# Patient Record
Sex: FEMALE | Race: WHITE | Employment: OTHER | URBAN - NONMETROPOLITAN AREA
[De-identification: names, ages, dates, MRNs, and addresses within clinical notes are randomized per-mention and may not be internally consistent; named-entity substitution may affect disease eponyms.]

---

## 2019-01-01 ENCOUNTER — OFFICE VISIT (OUTPATIENT)
Dept: RADIATION ONCOLOGY | Facility: HOSPITAL | Age: 69
End: 2019-01-01
Attending: RADIOLOGY
Payer: COMMERCIAL

## 2019-01-01 ENCOUNTER — RESULTS ONLY (OUTPATIENT)
Dept: RADIATION ONCOLOGY | Facility: HOSPITAL | Age: 69
End: 2019-01-01

## 2019-01-01 ENCOUNTER — OFFICE VISIT (OUTPATIENT)
Dept: RADIATION ONCOLOGY | Facility: HOSPITAL | Age: 69
End: 2019-01-01
Payer: COMMERCIAL

## 2019-01-01 ENCOUNTER — DOCUMENTATION ONLY (OUTPATIENT)
Dept: FAMILY MEDICINE | Facility: OTHER | Age: 69
End: 2019-01-01

## 2019-01-01 ENCOUNTER — APPOINTMENT (OUTPATIENT)
Dept: RADIATION ONCOLOGY | Facility: HOSPITAL | Age: 69
End: 2019-01-01
Payer: COMMERCIAL

## 2019-01-01 ENCOUNTER — ALLIED HEALTH/NURSE VISIT (OUTPATIENT)
Dept: RADIATION ONCOLOGY | Facility: HOSPITAL | Age: 69
End: 2019-01-01
Payer: COMMERCIAL

## 2019-01-01 ENCOUNTER — MEDICAL CORRESPONDENCE (OUTPATIENT)
Dept: HEALTH INFORMATION MANAGEMENT | Facility: CLINIC | Age: 69
End: 2019-01-01

## 2019-01-01 VITALS
HEART RATE: 84 BPM | BODY MASS INDEX: 45.63 KG/M2 | WEIGHT: 236.7 LBS | HEART RATE: 80 BPM | RESPIRATION RATE: 16 BRPM | SYSTOLIC BLOOD PRESSURE: 122 MMHG | HEART RATE: 80 BPM | BODY MASS INDEX: 44.97 KG/M2 | WEIGHT: 238 LBS | BODY MASS INDEX: 44.72 KG/M2 | SYSTOLIC BLOOD PRESSURE: 140 MMHG | DIASTOLIC BLOOD PRESSURE: 82 MMHG | DIASTOLIC BLOOD PRESSURE: 78 MMHG | RESPIRATION RATE: 16 BRPM | WEIGHT: 241.5 LBS | DIASTOLIC BLOOD PRESSURE: 64 MMHG | RESPIRATION RATE: 16 BRPM | SYSTOLIC BLOOD PRESSURE: 122 MMHG

## 2019-01-01 VITALS
SYSTOLIC BLOOD PRESSURE: 122 MMHG | WEIGHT: 240 LBS | DIASTOLIC BLOOD PRESSURE: 66 MMHG | BODY MASS INDEX: 45.35 KG/M2 | HEART RATE: 80 BPM | RESPIRATION RATE: 16 BRPM

## 2019-01-01 VITALS
BODY MASS INDEX: 44.4 KG/M2 | RESPIRATION RATE: 16 BRPM | SYSTOLIC BLOOD PRESSURE: 132 MMHG | DIASTOLIC BLOOD PRESSURE: 70 MMHG | WEIGHT: 235 LBS | HEART RATE: 76 BPM

## 2019-01-01 VITALS
RESPIRATION RATE: 16 BRPM | DIASTOLIC BLOOD PRESSURE: 62 MMHG | HEIGHT: 61 IN | HEART RATE: 92 BPM | BODY MASS INDEX: 47.01 KG/M2 | SYSTOLIC BLOOD PRESSURE: 116 MMHG | WEIGHT: 249 LBS

## 2019-01-01 DIAGNOSIS — C25.0 MALIGNANT NEOPLASM OF HEAD OF PANCREAS (H): Primary | ICD-10-CM

## 2019-01-01 LAB
RAD ONC ARIA COURSE ID: NORMAL
RAD ONC ARIA COURSE LAST TREATMENT DATE: NORMAL
RAD ONC ARIA COURSE START DATE: NORMAL
RAD ONC ARIA COURSE TREATMENT ELAPSED DAYS: 0
RAD ONC ARIA COURSE TREATMENT ELAPSED DAYS: 1
RAD ONC ARIA COURSE TREATMENT ELAPSED DAYS: 10
RAD ONC ARIA COURSE TREATMENT ELAPSED DAYS: 13
RAD ONC ARIA COURSE TREATMENT ELAPSED DAYS: 14
RAD ONC ARIA COURSE TREATMENT ELAPSED DAYS: 15
RAD ONC ARIA COURSE TREATMENT ELAPSED DAYS: 16
RAD ONC ARIA COURSE TREATMENT ELAPSED DAYS: 17
RAD ONC ARIA COURSE TREATMENT ELAPSED DAYS: 2
RAD ONC ARIA COURSE TREATMENT ELAPSED DAYS: 20
RAD ONC ARIA COURSE TREATMENT ELAPSED DAYS: 21
RAD ONC ARIA COURSE TREATMENT ELAPSED DAYS: 22
RAD ONC ARIA COURSE TREATMENT ELAPSED DAYS: 23
RAD ONC ARIA COURSE TREATMENT ELAPSED DAYS: 24
RAD ONC ARIA COURSE TREATMENT ELAPSED DAYS: 27
RAD ONC ARIA COURSE TREATMENT ELAPSED DAYS: 28
RAD ONC ARIA COURSE TREATMENT ELAPSED DAYS: 29
RAD ONC ARIA COURSE TREATMENT ELAPSED DAYS: 3
RAD ONC ARIA COURSE TREATMENT ELAPSED DAYS: 31
RAD ONC ARIA COURSE TREATMENT ELAPSED DAYS: 34
RAD ONC ARIA COURSE TREATMENT ELAPSED DAYS: 35
RAD ONC ARIA COURSE TREATMENT ELAPSED DAYS: 36
RAD ONC ARIA COURSE TREATMENT ELAPSED DAYS: 37
RAD ONC ARIA COURSE TREATMENT ELAPSED DAYS: 38
RAD ONC ARIA COURSE TREATMENT ELAPSED DAYS: 6
RAD ONC ARIA COURSE TREATMENT ELAPSED DAYS: 7
RAD ONC ARIA COURSE TREATMENT ELAPSED DAYS: 8
RAD ONC ARIA COURSE TREATMENT ELAPSED DAYS: 9
RAD ONC ARIA FIRST TREATMENT DATE: NORMAL
RAD ONC ARIA PLAN FRACTIONS TREATED TO DATE: 1
RAD ONC ARIA PLAN FRACTIONS TREATED TO DATE: 10
RAD ONC ARIA PLAN FRACTIONS TREATED TO DATE: 11
RAD ONC ARIA PLAN FRACTIONS TREATED TO DATE: 12
RAD ONC ARIA PLAN FRACTIONS TREATED TO DATE: 13
RAD ONC ARIA PLAN FRACTIONS TREATED TO DATE: 14
RAD ONC ARIA PLAN FRACTIONS TREATED TO DATE: 15
RAD ONC ARIA PLAN FRACTIONS TREATED TO DATE: 16
RAD ONC ARIA PLAN FRACTIONS TREATED TO DATE: 17
RAD ONC ARIA PLAN FRACTIONS TREATED TO DATE: 18
RAD ONC ARIA PLAN FRACTIONS TREATED TO DATE: 19
RAD ONC ARIA PLAN FRACTIONS TREATED TO DATE: 2
RAD ONC ARIA PLAN FRACTIONS TREATED TO DATE: 20
RAD ONC ARIA PLAN FRACTIONS TREATED TO DATE: 21
RAD ONC ARIA PLAN FRACTIONS TREATED TO DATE: 22
RAD ONC ARIA PLAN FRACTIONS TREATED TO DATE: 23
RAD ONC ARIA PLAN FRACTIONS TREATED TO DATE: 24
RAD ONC ARIA PLAN FRACTIONS TREATED TO DATE: 25
RAD ONC ARIA PLAN FRACTIONS TREATED TO DATE: 26
RAD ONC ARIA PLAN FRACTIONS TREATED TO DATE: 27
RAD ONC ARIA PLAN FRACTIONS TREATED TO DATE: 28
RAD ONC ARIA PLAN FRACTIONS TREATED TO DATE: 3
RAD ONC ARIA PLAN FRACTIONS TREATED TO DATE: 4
RAD ONC ARIA PLAN FRACTIONS TREATED TO DATE: 5
RAD ONC ARIA PLAN FRACTIONS TREATED TO DATE: 6
RAD ONC ARIA PLAN FRACTIONS TREATED TO DATE: 7
RAD ONC ARIA PLAN FRACTIONS TREATED TO DATE: 8
RAD ONC ARIA PLAN FRACTIONS TREATED TO DATE: 9
RAD ONC ARIA PLAN ID: NORMAL
RAD ONC ARIA PLAN NAME: NORMAL
RAD ONC ARIA PLAN PRESCRIBED DOSE PER FRACTION: 1.8 GY
RAD ONC ARIA PLAN TOTAL FRACTIONS PRESCRIBED: 28
RAD ONC ARIA PLAN TOTAL PRESCRIBED DOSE: 5040 CGY
RAD ONC ARIA REFERENCE POINT DOSAGE GIVEN TO DATE: NORMAL GY
RAD ONC ARIA REFERENCE POINT ID: NORMAL

## 2019-01-01 PROCEDURE — 77014 ZZHC CT GUIDE FOR PLACEMENT RADIATION THERAPY FIELDS: CPT | Mod: 26 | Performed by: RADIOLOGY

## 2019-01-01 PROCEDURE — 77386 ZZH IMRT TREATMENT DELIVERY, COMPLEX: CPT | Performed by: RADIOLOGY

## 2019-01-01 PROCEDURE — 77427 RADIATION TX MANAGEMENT X5: CPT | Performed by: RADIOLOGY

## 2019-01-01 PROCEDURE — G0463 HOSPITAL OUTPT CLINIC VISIT: HCPCS | Performed by: RADIOLOGY

## 2019-01-01 PROCEDURE — 77338 DESIGN MLC DEVICE FOR IMRT: CPT | Performed by: RADIOLOGY

## 2019-01-01 PROCEDURE — 77301 RADIOTHERAPY DOSE PLAN IMRT: CPT | Performed by: RADIOLOGY

## 2019-01-01 PROCEDURE — 77336 RADIATION PHYSICS CONSULT: CPT | Performed by: RADIOLOGY

## 2019-01-01 PROCEDURE — 77300 RADIATION THERAPY DOSE PLAN: CPT | Performed by: RADIOLOGY

## 2019-01-01 PROCEDURE — 77334 RADIATION TREATMENT AID(S): CPT | Performed by: RADIOLOGY

## 2019-01-01 RX ORDER — METOPROLOL SUCCINATE 100 MG/1
TABLET, EXTENDED RELEASE ORAL
COMMUNITY
Start: 2019-01-01

## 2019-01-01 RX ORDER — OXYCODONE HYDROCHLORIDE 5 MG/1
5-10 TABLET ORAL
COMMUNITY
Start: 2019-01-01 | End: 2019-01-01

## 2019-01-01 RX ORDER — ONDANSETRON 4 MG/1
TABLET, ORALLY DISINTEGRATING ORAL
Refills: 0 | COMMUNITY
Start: 2019-01-01

## 2019-01-01 RX ORDER — PROCHLORPERAZINE MALEATE 10 MG
TABLET ORAL
Refills: 2 | COMMUNITY
Start: 2019-01-01

## 2019-01-01 RX ORDER — FUROSEMIDE 20 MG
10 TABLET ORAL
COMMUNITY
Start: 2019-01-01

## 2019-01-01 RX ORDER — TIOTROPIUM BROMIDE 18 UG/1
1 CAPSULE ORAL; RESPIRATORY (INHALATION)
COMMUNITY
Start: 2019-01-01

## 2019-01-01 RX ORDER — PAROXETINE 20 MG/1
TABLET, FILM COATED ORAL
COMMUNITY
Start: 2018-06-29

## 2019-01-01 RX ORDER — AMITRIPTYLINE HYDROCHLORIDE 50 MG/1
TABLET ORAL
COMMUNITY
Start: 2018-07-13

## 2019-01-01 RX ORDER — OXYCODONE HYDROCHLORIDE 5 MG/1
5-10 TABLET ORAL
COMMUNITY
Start: 2019-01-01

## 2019-01-01 RX ORDER — METOPROLOL SUCCINATE 25 MG/1
TABLET, EXTENDED RELEASE ORAL
COMMUNITY
Start: 2019-01-01

## 2019-01-01 RX ORDER — ALBUTEROL SULFATE 90 UG/1
AEROSOL, METERED RESPIRATORY (INHALATION)
Refills: 0 | COMMUNITY
Start: 2019-01-01

## 2019-01-01 RX ORDER — CAPECITABINE 500 MG/1
1500 TABLET, FILM COATED ORAL
COMMUNITY
Start: 2019-01-01

## 2019-01-01 RX ORDER — LORAZEPAM 0.5 MG/1
TABLET ORAL
COMMUNITY
Start: 2019-01-01

## 2019-01-01 RX ORDER — WARFARIN SODIUM 1 MG/1
TABLET ORAL
COMMUNITY
Start: 2019-01-01

## 2019-01-01 RX ORDER — POTASSIUM CHLORIDE 1500 MG/1
20 TABLET, EXTENDED RELEASE ORAL
COMMUNITY
Start: 2018-02-22

## 2019-01-01 ASSESSMENT — PAIN SCALES - GENERAL
PAINLEVEL: SEVERE PAIN (6)
PAINLEVEL: NO PAIN (0)
PAINLEVEL: NO PAIN (0)
PAINLEVEL: MODERATE PAIN (4)
PAINLEVEL: MODERATE PAIN (5)

## 2019-01-01 ASSESSMENT — PATIENT HEALTH QUESTIONNAIRE - PHQ9: SUM OF ALL RESPONSES TO PHQ QUESTIONS 1-9: 4

## 2019-01-01 ASSESSMENT — MIFFLIN-ST. JEOR: SCORE: 1601.84

## 2019-05-20 NOTE — PROGRESS NOTES
"INITIAL PATIENT ASSESSMENT    .  Chief Complaint   Patient presents with     Radiation Therapy       Initial /62 (BP Location: Left arm, Patient Position: Chair, Cuff Size: Adult Large)   Pulse 92   Resp 16   Ht 1.549 m (5' 1\")   Wt 112.9 kg (249 lb)   BMI 47.05 kg/m   Estimated body mass index is 47.05 kg/m  as calculated from the following:    Height as of this encounter: 1.549 m (5' 1\").    Weight as of this encounter: 112.9 kg (249 lb).  Medication Reconciliation: complete    Referring Physician: Friday  Other Physicians: Hayley Benson    Diagnosis: pancreatic cancer    Prior radiation therapy: None    Prior chemotherapy:   Protocol: Gemcitabine/abraxane/5FU/Oxaliplatin  Facility:   Dates:       Protocol: Xeloda  Facility:   Dates: Starting with Radiation      Prior hormonal therapy:N/A    Pain Eval:  Current history of pain associated with this visit:   Intensity: 5/10  Current: dull  Location: abdomen   Treatment: oxycodone    Psychosocial  Marital Status:    Spouse/Significant other: n/a   Children: 2 living; 1 ; 1 foster child   Occupation: mental health caretaker    Retired: Yes  Living arrangements: home alone  Do you feel safe at home? Yes  Activity status: ambulates with assistive device occasionally, otherwise independent   referral needs: Not needed    Advanced Directive: No    Breast Exam: 2018  Mammogram: 2018  Last Pap: many years  : 3  Para: 3  Onset of menarche: 14  LMP: No LMP recorded.  Onset of menopause: 40  Abnormal vaginal bleeding/discharge: No  Are you pregnant? No  Reproductive note: n/a    Patient was assessed for the influenza, pneumo-poly, prevnar 13, Tdap, and shingles immunizations. \"Vaccinations and Cancer Treatment\" flyer given.  Instructed patient to discuss vaccination status with his/her PCM.     Patient was assessed using the NCCN psychosocial distress thermometer. Patient rated the score as a 0. Patient rated current " stressors as n/a. Stressors will be brought to the attention of provider or Oncology RN Care Coordinator for a score of 6 or greater or per nurses discretion.   Pt is here today for a consult for radiation therapy for pancreatic cancer.  Educated patient on the mapping process and the possible side effects of XRT to the abdomen, to include: fatigue, skin reaction, nausea/vomiting, and diarrhea.  Pt verbalizes an understanding and has no questions at this time.   Erica Ledezma RN

## 2019-05-20 NOTE — PROGRESS NOTES
"[facility]    Radiation Oncology Consultation     Date of Consult: 05/20/19    Referring Physician  Abran Almanza MD    Primary Care Physician   Jayna Benson NP    Reason for Consult   I was asked by Dr Abran Almanza to see Marci Patrick in consultation for her pancreatic cancer.    History is obtained from the patient and her medical records.    History of the Present Illness  Marci Patrick is a 69 year old female who has a history of cervical carcinoma treated with THELMA/BSO and was diagnosed with pancreatic adenocarcinoma on endoscopic ultrasound-guided fine needle aspiration 1/18/2019 after she presented with abdominal pain and pancreatitis.  She required an external biliary drain which was later internalized with a stent by Dr. Patrick Hayward.  The tumor was \"borderline resectable\" due to periportal adenopathy and the tumor situated adjacent to the superior mesenteric artery.    CT of the abdomen on 12/25/2018 showed extensive inflammatory change centered at the pancreatic head and descending duodenum as well as hepatic steatosis.    CT of the abdomen on 1/9/2019 showed a pigtail drain catheter terminating at the level of the pancreatic head and duodenum with significantly decreased regional inflammation as compared to 12/25/2018.  There was heterogenous enhancement of the head of the pancreas, presumably neoplastic in nature; dilated pancreatic duct and atrophy of the body and tail.  Again noted was diffuse fatty liver infiltration.    MRI of the abdomen on 1/17/2019 showed an ill-defined 3.0 cm periportal enhancing area within the left hepatic lobe, suggestive of transient hepatic intensity difference or less likely a malignant process.  A small 2.0 cm hepatic cavernous hemangioma at the hepatic dome.  Ill-defined pancreatic mass consistent with known malignancy.  Multiple enlarged gastrohepatic lymph nodes suspicious for pati metastasis.    CT of the abdomen on 1/30/2019 showed circumferential wall " thickening with moderate mesenteric inflammatory changes surrounding the proximal duodenum suspicious for an inflammatory/infectious process.  Common bile duct stent in place with pneumobilia.  Heterogenous pancreatic head mass consistent with known neoplasm, persistent pancreatic ductal dilatation.  Persistent enlarged gastrohepatic lymph nodes.  Persistent multiple subcutaneous inflammatory changes of the abdominal pannus, nonspecific.    CT of the abdomen on 2/3/2019 showed an inflammatory process predominating around the duodenum/pancreatic head with some stranding of fat into the more inferior retroperitoneum slightly progressive.  No appreciable abscess.    CT scan of the chest abdomen and pelvis on 2/7/2019 showed worsened lung aeration with possible interstitial type pneumonitis versus volume overload.  Fatty liver without obstruction.  Biliary air with biliary ductal stent in place and low-density pancreatic head process as before.  No new fluid collections.    CT scan of the head on 3/31/2019 was unremarkable with no acute intracranial hemorrhage.    CT of the cervical spine on 3/31/2019 showed no cervical spine fracture or subluxation.    CT angiogram of the chest on 4/1/2019 showed no CTA evidence of acute pulmonary embolism.  Scattered groundglass airspace opacities within the bilateral lower lobes, decreased since the prior study, may suggest an inflammatory/infectious process.  Left coronary artery atherosclerotic vascular calcifications.  Extravasation of injected IV contrast into the subcutaneous soft tissues of the right breast surrounding the chest port.      CT of the chest abdomen and pelvis on 5/7/2019 showed stable ill-defined pancreatic head mass (approximately 1.8 x 1.2 cm) with dilated pancreatic duct.  Stable common bile duct stent with associated pneumobilia.  Mild groundglass airspace opacities within the bilateral lower lobes, slightly decreased since the prior study.    The patient has  received combination chemotherapy with gemcitabine, Abraxane, 5-FU & oxaliplatinum.  The patient states she was an inpatient for her first cycle of chemotherapy, which she did not tolerate well.  She experienced hallucinations and was unable to walk.  She has tolerated the subsequent 3 cycles of chemotherapy very well.  Her current ECOG performance status is 1-2.  Her last cycle of chemotherapy was administered 5/8/2019.    PAST MEDICAL HISTORY:   Past Medical History:   Diagnosis Date     Atrial flutter (H)      Cervical cancer (H)      COPD (chronic obstructive pulmonary disease) (H)      Depression      DMII (diabetes mellitus, type 2) (H)      HTN (hypertension)      Hx of deep venous thrombosis        PAST SURGICAL HISTORY:   Past Surgical History:   Procedure Laterality Date     APPENDECTOMY       AS REVISE TOTAL HIP REPLACEMENT Left      CARPAL TUNNEL RELEASE RT/LT Right      CATARACT IOL, RT/LT Bilateral      CHOLECYSTECTOMY       HERNIA REPAIR       HYSTERECTOMY RADICAL, SALPINGO-OOPHORECTOMY Bilateral      LAPAROSCOPIC GASTRIC SLEEVE         FAMILY HISTORY:   Family History   Problem Relation Age of Onset     Lung Cancer Mother      Breast Cancer Sister      Cervical Cancer Sister      Lung Cancer Sister         Twin sister       SOCIAL HISTORY:   Social History     Tobacco Use     Smoking status: Current Every Day Smoker     Packs/day: 1.00     Years: 35.00     Pack years: 35.00     Types: Cigarettes     Smokeless tobacco: Never Used     Tobacco comment: down to 0.25 ppd   Substance Use Topics     Alcohol use: Not Currently       Code Status    Full code.    CURRENT MEDICATIONS:   Current Outpatient Medications   Medication     amitriptyline (ELAVIL) 50 MG tablet     amylase-lipase-protease (CREON) 12391-33215 units CPEP per EC capsule     blood glucose (ACCU-CHEK GUIDE) test strip     capecitabine (XELODA) 500 MG tablet CHEMO     furosemide (LASIX) 20 MG tablet     insulin aspart (NOVOLOG FLEXPEN) 100  "UNIT/ML pen     insulin isophane human (HUMULIN N KWIKPEN) 100 UNIT/ML injection     LORazepam (ATIVAN) 0.5 MG tablet     magnesium oxide (MAG-OX) 400 (241.3 Mg) MG tablet     metoprolol succinate ER (TOPROL-XL) 100 MG 24 hr tablet     metoprolol succinate ER (TOPROL-XL) 25 MG 24 hr tablet     oxyCODONE (ROXICODONE) 5 MG tablet     PARoxetine (PAXIL) 20 MG tablet     potassium chloride ER (K-DUR/KLOR-CON M) 20 MEQ CR tablet     tiotropium (SPIRIVA HANDIHALER) 18 MCG inhaled capsule     warfarin (COUMADIN) 1 MG tablet     albuterol (PROAIR RESPICLICK) 108 (90 Base) MCG/ACT inhaler     ondansetron (ZOFRAN-ODT) 4 MG ODT tab     prochlorperazine (COMPAZINE) 10 MG tablet     No current facility-administered medications for this visit.        Allergies   Allergies   Allergen Reactions     Bupropion Hives     Zyban       Review of Systems   Some fatigue.  Mild persistent abdominal pain.  Ankle swelling which has resolved.  A complete 12 point review of systems is otherwise negative.    Physical Exam     VITAL SIGNS:  /62 (BP Location: Left arm, Patient Position: Chair, Cuff Size: Adult Large)   Pulse 92   Resp 16   Ht 1.549 m (5' 1\")   Wt 112.9 kg (249 lb)   BMI 47.05 kg/m    Wt Readings from Last 4 Encounters:   05/20/19 112.9 kg (249 lb)   General: Awake, alert, cooperative, no apparent distress, and appears stated age.  Head: Normocephalic, without obvious abnormality, atraumatic.  Eyes: Lids normal, pupils equal, round and reactive to light, extra ocular muscles intact, sclera clear with no icterus, conjunctiva normal.  ENT: External ears and nose without lesions, oral pharynx with moist mucus membranes, no mucosal erythema or exudates, gums normal and good dentition.  Neck: supple, trachea midline, no thyromegaly.   Lymphatics: There are no lymph nodes palpable in the cervical or supraclavicular areas.  Respiratory: Normal respiratory effort, good air exchange, clear to auscultation bilaterally, no crackles " or wheezing.  Cardiovascular: Normal heart sounds with regular rate and rhythm and no murmur. Peripheral pulses intact.  Abdomen: Normal bowel sounds, soft, obese, non-tender, no masses palpated, no hepatosplenomegaly.  Musculoskeletal: There is no redness, warmth, or swelling of the joints.  Full range of motion noted.  Tone is normal.  Neurologic: Awake, alert, oriented to name, place and time.  Cranial nerves II-XII are grossly intact.  Muscle strength is 5 out of 5 bilaterally. Gait is normal.   Psychiatric: Calm, normal eye contact, alert, normal affect, memory for past and recent events intact and thought process normal.    PATHOLOGY FINDINGS: See HPI.    IMAGING:  See HPI.    IMPRESSION: Pancreatic adenocarcinoma, essentially unresectable due to proximity to the superior mesenteric artery and presence of periportal adenopathy; s/p combination chemotherapy x4 cycles.    RECOMMENDATIONS: Recommend proceeding with combined radiation therapy and chemotherapy.  Potential benefits and risks were discussed in detail with the patient.  All of her questions were answered to her satisfaction.  She wishes to proceed with treatment as recommended.  Informed consent was obtained.    PLAN: The patient will return for a treatment planning CT scan of the abdomen.  After an acceptable treatment plan has been developed, she will return to begin combined external beam radiation therapy with oral capecitabine chemotherapy.    Thank you for asking us to participate in the care of Marci Patrick.      Lucy Barboza MD

## 2019-05-24 NOTE — PROGRESS NOTES
RADIATION THERAPY SIMULATION NOTE     DIAGNOSIS  Pancreatic adenocarcinoma, essentially unresectable due to proximity to the superior mesenteric artery and presence of periportal adenopathy; s/p combination chemotherapy x4 cycles.      PROCEDURE  Marci Patrick was brought into the simulation suite and placed in a modified supine position.  Careful alignment was accomplished using orthogonal lasers.      Positioning was aided with the use of a vacuum bag.      Bolus was not utilized.      Isocenter was placed & images were reviewed.     All imaging will be used for treatment volume delineation, isodose planning and treatment field design for treatment of the patient's pancreatic adenocarcinoma. Anticipate IMRT treatment technique will be used.    Thank you for asking us to participate in the care of Marci Patrick.    Lucy Barboza MD

## 2019-06-11 NOTE — PROGRESS NOTES
DIAGNOSIS:  Pancreatic head adenocarcinoma, essentially unresectable due to proximity to the superior mesenteric artery and presence of periportal adenopathy. She is status post-4 cycles of infusional combination chemotherapy.    RADIOTHERAPY:  Today she received number 6 out of a scheduled total of 28 treatments to her pancreas tumor by IMRT technique.  The cumulative dose to date is 1080 cGy. The daily fraction size is 180 cGy.    She receives adjuvant chemotherapy with oral Xeloda.    SUBJECTIVE:  She is tolerating the chemoradiotherapy quite well. She does often have nausea, without vomiting, after meals. She gets relief by taking oral Compazine. Xeloda has not caused any hand or foot problems or stomatitis. She says there has been some recent weight loss, from 249# to 241#.     She continues to experience the abdominal pains that radiate from the front to her back. For these she gets relief from 10 milligrams of Oxycodone about twice a day. However, she is quite certain that the frequency of these pains has diminished since the radiotherapy and Xeloda were started.  She recalls that there have been 2 recent days when Oxycodone has not been needed.    She has started using a cane because of feeling short of breath.    PLAN:  Continue with the combined treatments as scheduled.     Kenroy Austin MD

## 2019-06-21 NOTE — PROGRESS NOTES
[facility]  SPECIALIZING IN BREAKTHROUGHS  Radiation Oncology    On Treatment Visit Note      Marci Patrick      Date: 2019   MRN: [id number]   : 1950  Diagnosis: Pancreatic Cancer    Treatment Summary to Date   upper abdomen/Pancreas  2520 cGy   14 of 28 planned fx     Chemotherapy  Chemo concurrent with radx?: Yes  Oncologist: Friday  Drug Name/Frequency 1: Xeloda    Subjective: Doing well with some nausea. Zofran helps  No diaahea     Nursing ROS:   Nutrition Alteration  Diet Type: Patient's Preference  Anorexia NCI: 1 - Loss of appetite  Skin  Skin Reaction: 0 - No changes     ENT and Mouth Exam  Mucositis - Current: 0 - None   Mucositis - Internal Severity: 0 - None      Gastrointestinal  Nausea: 1 - One to two episodes of nausea/24  Vomitin - No vomiting (ons)  Diarrhea: 0 - None     Psychosocial  Mood - Anxiety: 0 - Normal  Mood - Depression: 0 - Normal  Pyschosocial Note: fatigue: 6/10       Objective:   /66   Pulse 80   Resp 16   Wt 108.9 kg (240 lb)   BMI 45.35 kg/m    Gen: Appears well, NAD       Assessment:    Tolerating radiation therapy well.  All questions and concerns addressed.    Plan:   1. Continue current therapy.        Mosaiq chart and setup information reviewed  MVCT checked    Gautam Haqeu MD  Radiation Oncology

## 2019-06-27 NOTE — PROGRESS NOTES
DIAGNOSIS:  Adenocarcinoma of the pancreatic head, essentially unresectable due to proximity to the superior mesenteric artery and presence of periportal adenopathy. She is status post-4 cycles of infusional combination chemotherapy.     RADIOTHERAPY:  She has received 3420 cGy in 18 of 28 fractions to her pancreas tumor by IMRT technique.      She receives adjuvant chemotherapy with oral Xeloda.     SUBJECTIVE:  She is tolerating the chemoradiotherapy very well. Her appetite is not good. She does have nausea, but no vomiting. Compazine helps. She denies any hand/foot problems & stomatitis. She sleeps well. No diarrhea.     She continues to have intermittent abdominal pains that radiate from her front to her back. Oxycodone 10 mg BID is helpful. She says that there are days when Oxycodone has not been needed.     OBJECTIVE:  Weight today is 237.5 lbs, down from 241.5 at the start of treatment. Skin in the irradiated area is intact     ASSESSMENT: Stable.    PLAN:  Continue with the combined modality treatment as scheduled.   Patient was counseled about weight loss & given samples of liquid nutritional supplements       Lucy Barboza MD

## 2019-07-03 NOTE — PROGRESS NOTES
Progress Notes  Encounter Date: Jul 3, 2019  Jason Ramos MD     RADIATION ONCOLOGY PROGRESS NOTE     Patient Care Team       Relationship Specialty Notifications Start End    Jayna Benson, NP PCP - General   5/23/19     Phone: [phone] Fax: [phone]         [facility] [address] [facility] [address]          DIAGNOSIS: (C25.0) Malignant neoplasm of head of pancreas (H)  (primary encounter diagnosis)     RADIATION THERAPY:    Marci Patrick has received 3960 cGy to date for pancreatic cancer, unresectable.       SUBJECTIVE:    She reports mild to moderate nausea. No vomiting. She has mild fatigue. She has minimal abdominal pain at times.        OBJECTIVE:    /82 (BP Location: Left arm, Patient Position: Chair, Cuff Size: Adult Large)   Pulse 84   Resp 16   Wt 107.4 kg (236 lb 11.2 oz)   BMI 44.72 kg/m    Examination reveals Weight down a pound in the past week. Otherwise stable.       IMPRESSION:  Routine tolerance to radiation therapy.      PLAN:  Continue treatment as planned.           Jason Ramos MD

## 2019-07-10 NOTE — PROGRESS NOTES
Progress Notes  Encounter Date: Jul 10, 2019  Sancho Tilley MD     RADIATION ONCOLOGY PROGRESS NOTE     Patient Care Team       Relationship Specialty Notifications Start End    Jayna Benson NP PCP - General   5/23/19     Phone: [phone] Fax: [phone]         [facility] [address] [facility] [address]                  DIAGNOSIS: (C25.0) Malignant neoplasm of head of pancreas (H)  (primary encounter diagnosis)           RADIATION THERAPY:    Marci Patrick has received 4680 cGy to date for her pancreas cancer. Pt taking concurrent Capecitabine      SUBJECTIVE:    She is doing well currently and is asymptomatic with the exception of poor appetite. She reports no nausea or vomiting. Her fatigue is very mild       OBJECTIVE:    /70 (BP Location: Left arm, Patient Position: Chair, Cuff Size: Adult Regular)   Pulse 76   Resp 16   Wt 106.6 kg (235 lb)   BMI 44.40 kg/m    Examination reveals Abdomen is normal and soft. Skin shows a mild treatment related dermatitis.        Ports reviewed and approved.      IMPRESSION:  Routine tolerance to radiation therapy. Patient reporting no unexpected side effects.  We discussed MM and recommended she discuss that with her PCP.      PLAN:  Continue treatment as planned           Sancho Tilley MD

## 2019-07-12 NOTE — PROGRESS NOTES
Progress Notes  Encounter Date: Jul 12, 2019  Sancho Tilley MD     RADIATION ONCOLOGY OTV NOTE AND RADIATION THERAPY TREATMENT SUMMARY    Patient Care Team       Relationship Specialty Notifications Start End    Jayna Benson NP PCP - General   5/23/19     Phone: [phone] Fax: [phone]         [facility] [address] [facility] [address]                  DIAGNOSIS: (C25.0) Malignant neoplasm of head of pancreas (H)  (primary encounter diagnosis)     RADIATION THERAPY:    Marci Patrick has received 5040 cGy to the partial abdomen to complete her external beam radiotherapy for her pancreas cancer. Pt took concurrent Capecitabine under the care of MO.    BRIEF HISTORY:  Patient is a 69-year-old female with the past history of cervical carcinoma treated with THELMA/BSO.  Patient was recently diagnosed with pancreatic adenocarcinoma on endoscopic ultrasound-guided fine-needle aspiration January 18, 2019, after presenting with abdominal pain and pancreatitis.  Gastroenterologist Dr. Patrick Hayward placed an external biliary drain which was later internalized with a stent.  Tumor was borderline resectable due to the periportal adenopathy and the tumor was situated adjacent to the superior mesenteric artery.  The patient had received combination chemotherapy with gemcitabine, Abraxane 5-FU and oxalic platinum prior to starting her radiotherapy.  She had adverse reactions to chemotherapy initially she then tolerated 3 cycles of chemotherapy well.  Her ECOG performance status remained as a 1 or 2.  Her last cycle of chemotherapy was administered May 8, 2019.  Patient started external beam radiation therapy with concurrent oral Capecitabine on June 5, 2019.     Patient underwent CT simulation and her therapy was planned and delivered using VMAT IMRT (Rapid Arc), with 6 MV photons.  Marci received a total dose of 5040 cGy delivered in 28 treatment fractions at 180 cGy per fraction.  Total elapsed  calendar days of treatment was 38 days.  Her completion date is today July 12, 2019.    SUBJECTIVE:    Marci  is doing reasonably well but does report a poor appetite. She reports no nausea or vomiting. Her fatigue is very mild.  Continues to have low grade abdominal pain controlled with 3-4 oxycodone tablets per day.  She is suffering no problems with constipation or nausea from her prescription analgesic.      OBJECTIVE:    There were no vitals taken for this visit.  Examination reveals Abdomen is  Soft and nontender  Skin shows a mild treatment related dermatitis.  No signs of jaundice or scleral icterus.   Lungs are clear and heart has a regular rhythm and rate without extra sounds.    Ports reviewed and approved.      IMPRESSION:  Routine tolerance to radiation therapy. Patient reporting no unexpected side effects.  We discussed MM and recommended she discuss that with her PCP. We also discussed treating her abdominal pain  With OTC antacids for probable treatment related gastritis/enteritis. She may develop post prandial diarrhea in which case she may benefit from prescription strength H2 blockers and choleystyramine.       PLAN:   Continue to follow with Dr Osullivan Friday ([place]).  Marci was scheduled for a 3-month follow-up visit here in the Radiation Oncology Department.  She  understands she can contact us at anytime with questions or concerns.  Thank you for asking us to participate the care of Ms. Marci Sheetscihlango Tilley MD  [facility] Radiation Oncology

## 2019-10-25 PROBLEM — Z51.5 PALLIATIVE CARE PATIENT: Status: ACTIVE | Noted: 2019-01-01

## 2019-11-27 PROBLEM — J44.9 CHRONIC OBSTRUCTIVE PULMONARY DISEASE (H): Status: ACTIVE | Noted: 2019-01-01

## 2019-11-27 PROBLEM — S09.90XA CLOSED HEAD INJURY: Status: ACTIVE | Noted: 2019-01-01

## 2019-11-27 PROBLEM — E66.01 MORBID OBESITY WITH BMI OF 45.0-49.9, ADULT (H): Status: ACTIVE | Noted: 2018-07-16

## 2019-11-27 PROBLEM — J96.01 ACUTE RESPIRATORY FAILURE WITH HYPOXIA (H): Status: ACTIVE | Noted: 2019-01-01

## 2019-11-27 PROBLEM — Z79.4 TYPE 2 DIABETES MELLITUS WITHOUT COMPLICATION, WITH LONG-TERM CURRENT USE OF INSULIN (H): Status: ACTIVE | Noted: 2018-12-25

## 2019-11-27 PROBLEM — M19.90 OSTEOARTHROSIS: Status: ACTIVE | Noted: 2019-01-01

## 2019-11-27 PROBLEM — F41.8 MIXED ANXIETY DEPRESSIVE DISORDER: Status: ACTIVE | Noted: 2019-01-01

## 2019-11-27 PROBLEM — I82.409 DVT (DEEP VENOUS THROMBOSIS) (H): Status: ACTIVE | Noted: 2019-01-01

## 2019-11-27 PROBLEM — J98.4 CHEMOTHERAPY INDUCED PULMONARY TOXICITY: Status: ACTIVE | Noted: 2019-01-01

## 2019-11-27 PROBLEM — E11.9 TYPE 2 DIABETES MELLITUS WITHOUT COMPLICATION, WITH LONG-TERM CURRENT USE OF INSULIN (H): Status: ACTIVE | Noted: 2018-12-25

## 2019-11-27 PROBLEM — D64.81 ANEMIA ASSOCIATED WITH CHEMOTHERAPY: Status: ACTIVE | Noted: 2019-01-01

## 2019-11-27 PROBLEM — R91.8 PULMONARY NODULES: Status: ACTIVE | Noted: 2017-10-03

## 2019-11-27 PROBLEM — I48.92 ATRIAL FLUTTER (H): Status: ACTIVE | Noted: 2019-01-01

## 2019-11-27 PROBLEM — T45.1X5A ANEMIA ASSOCIATED WITH CHEMOTHERAPY: Status: ACTIVE | Noted: 2019-01-01

## 2019-11-27 PROBLEM — J44.89 ASTHMA WITH COPD (H): Status: ACTIVE | Noted: 2017-10-03

## 2019-11-27 PROBLEM — C25.0 MALIGNANT NEOPLASM OF HEAD OF PANCREAS (H): Status: ACTIVE | Noted: 2019-01-01

## 2019-11-27 PROBLEM — I82.409 DEEP VEIN THROMBOSIS (DVT) OF LOWER EXTREMITY (H): Status: ACTIVE | Noted: 2019-01-01

## 2019-11-27 PROBLEM — C25.9 PRIMARY ADENOCARCINOMA OF PANCREAS (H): Status: ACTIVE | Noted: 2019-01-01

## 2019-11-27 PROBLEM — T45.1X5A CHEMOTHERAPY INDUCED PULMONARY TOXICITY: Status: ACTIVE | Noted: 2019-01-01

## 2019-12-20 NOTE — PROGRESS NOTES
PHYSICIAN CERTIFICATION OF TERMINAL ILLNESS FOR HOSPICE BENEFIT    December 18, 2019    Marci Patrick    1950      Effective Date of Certification    Start Date: 12-18-19      End Date:  03-16-20    Terminal Diagnosis:    Pancreatic Cancer      Secondary Diagnoses:     Liver metastases      Prognosis Related Comorbidities:    Diabetes mellitus, type 2     Heart failure     Hypertension     Atrial flutter, paroxysmal    Recurrent DVT\              Narrative Statement:  Client suffers from metastatic pancreatic cancer and is not undergoing disease directed therapy.  She continues to decline. [facility] and requires assistance with ADL's.  Goals of symptom control will be met.  Because of the progressive nature of the illness and associated comorbidities, client qualifies for hospice care.             I certify that this patient is terminally ill and has a life expectancy of 6 months or less if the terminal illness runs its anticipated course.        Attestation:  I confirm that this narrative was composed by myself and is based on review of the medical record and/or examination of the patient.              Brian Dukes MD

## 2019-12-27 ENCOUNTER — DOCUMENTATION ONLY (OUTPATIENT)
Dept: OTHER | Facility: CLINIC | Age: 69
End: 2019-12-27